# Patient Record
Sex: FEMALE | Race: BLACK OR AFRICAN AMERICAN | NOT HISPANIC OR LATINO | URBAN - METROPOLITAN AREA
[De-identification: names, ages, dates, MRNs, and addresses within clinical notes are randomized per-mention and may not be internally consistent; named-entity substitution may affect disease eponyms.]

---

## 2017-09-13 ENCOUNTER — EMERGENCY (EMERGENCY)
Facility: HOSPITAL | Age: 62
LOS: 1 days | Discharge: HOME | End: 2017-09-13
Attending: EMERGENCY MEDICINE
Payer: SELF-PAY

## 2017-09-13 VITALS
DIASTOLIC BLOOD PRESSURE: 90 MMHG | HEIGHT: 66 IN | RESPIRATION RATE: 18 BRPM | SYSTOLIC BLOOD PRESSURE: 155 MMHG | OXYGEN SATURATION: 100 % | TEMPERATURE: 98 F | HEART RATE: 57 BPM | WEIGHT: 120.15 LBS

## 2017-09-13 VITALS
SYSTOLIC BLOOD PRESSURE: 146 MMHG | TEMPERATURE: 98 F | RESPIRATION RATE: 16 BRPM | DIASTOLIC BLOOD PRESSURE: 78 MMHG | HEART RATE: 60 BPM | OXYGEN SATURATION: 98 %

## 2017-09-13 DIAGNOSIS — M25.511 PAIN IN RIGHT SHOULDER: ICD-10-CM

## 2017-09-13 PROCEDURE — 73030 X-RAY EXAM OF SHOULDER: CPT | Mod: 26,RT

## 2017-09-13 PROCEDURE — 73030 X-RAY EXAM OF SHOULDER: CPT

## 2017-09-13 PROCEDURE — 99284 EMERGENCY DEPT VISIT MOD MDM: CPT

## 2017-09-13 PROCEDURE — 99283 EMERGENCY DEPT VISIT LOW MDM: CPT | Mod: 25

## 2017-09-13 RX ORDER — IBUPROFEN 200 MG
600 TABLET ORAL ONCE
Qty: 0 | Refills: 0 | Status: COMPLETED | OUTPATIENT
Start: 2017-09-13 | End: 2017-09-13

## 2017-09-13 RX ADMIN — Medication 600 MILLIGRAM(S): at 15:59

## 2017-09-13 NOTE — ED PROVIDER NOTE - PROGRESS NOTE DETAILS
Xray shows no signs of fracture or dislocation. Patient will need to follow up with PMD and recommended taking IBU for pain. Pt is well appearing walking with steady gait, stable for discharge and follow up without fail with medical doctor. I had a detailed discussion with the patient and/or guardian regarding the historical points, exam findings, and any diagnostic results supporting the discharge diagnosis. Pt educated on care and need for follow up. Strict return instructions and red flag signs and symptoms discussed with patient. Questions answered. Pt shows understanding of discharge information and agrees to follow.

## 2017-09-13 NOTE — ED PROVIDER NOTE - MEDICAL DECISION MAKING DETAILS
62 year-old female, presents with right shoulder pain today. Well-appearing, hypertensive, other vital signs within normal limits, afebrile. Neurovascularly intact. Likely muscle strain. Plan: xray, IBU, ice and re-assess with likely dc home.

## 2017-09-13 NOTE — ED PROVIDER NOTE - PHYSICAL EXAMINATION
Right shoulder exam: No skin breakdown, ecchymosis or gross deformity. Mild AC tenderness. No tenderness of the clavicle or subacromial bursa. No humeral tenderness or deformity. Full internal and external rotation of shoulder and abduction/adduction. Moderate pain during range of motion testing. Brachial, radial and ulnar pulses strong 2+. Capillary refill < 2 sec. No sensory or motor deficits.

## 2017-09-13 NOTE — ED PROVIDER NOTE - ATTENDING CONTRIBUTION TO CARE
right shoulder pain  PE:  right shoulder;  FROm, N/V intact. mild pain with ROM  A/P:  Shoulder strain.  dc with supportive care
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
